# Patient Record
Sex: FEMALE
[De-identification: names, ages, dates, MRNs, and addresses within clinical notes are randomized per-mention and may not be internally consistent; named-entity substitution may affect disease eponyms.]

---

## 2024-04-30 PROBLEM — Z00.00 ENCOUNTER FOR PREVENTIVE HEALTH EXAMINATION: Status: ACTIVE | Noted: 2024-04-30

## 2024-05-02 ENCOUNTER — LABORATORY RESULT (OUTPATIENT)
Age: 44
End: 2024-05-02

## 2024-05-02 ENCOUNTER — APPOINTMENT (OUTPATIENT)
Dept: PULMONOLOGY | Facility: CLINIC | Age: 44
End: 2024-05-02
Payer: MEDICAID

## 2024-05-02 VITALS
OXYGEN SATURATION: 97 % | WEIGHT: 138 LBS | BODY MASS INDEX: 22.18 KG/M2 | HEART RATE: 95 BPM | HEIGHT: 66 IN | DIASTOLIC BLOOD PRESSURE: 86 MMHG | SYSTOLIC BLOOD PRESSURE: 121 MMHG

## 2024-05-02 DIAGNOSIS — J45.901 UNSPECIFIED ASTHMA WITH (ACUTE) EXACERBATION: ICD-10-CM

## 2024-05-02 PROCEDURE — 99204 OFFICE O/P NEW MOD 45 MIN: CPT | Mod: 25

## 2024-05-02 PROCEDURE — 94726 PLETHYSMOGRAPHY LUNG VOLUMES: CPT

## 2024-05-02 PROCEDURE — ZZZZZ: CPT

## 2024-05-02 PROCEDURE — 94729 DIFFUSING CAPACITY: CPT

## 2024-05-02 PROCEDURE — 94060 EVALUATION OF WHEEZING: CPT

## 2024-05-02 RX ORDER — ALBUTEROL SULFATE 90 UG/1
108 (90 BASE) INHALANT RESPIRATORY (INHALATION)
Qty: 1 | Refills: 2 | Status: ACTIVE | COMMUNITY
Start: 2024-05-02 | End: 1900-01-01

## 2024-05-02 NOTE — REASON FOR VISIT
[Initial] : an initial visit [Shortness of Breath] : shortness of breath [Wheezing] : wheezing [Parent] : parent

## 2024-05-03 LAB
BASOPHILS # BLD AUTO: 0.02 K/UL
BASOPHILS NFR BLD AUTO: 0.3 %
EOSINOPHIL # BLD AUTO: 0.08 K/UL
EOSINOPHIL NFR BLD AUTO: 1.2 %
HCT VFR BLD CALC: 41.8 %
HGB BLD-MCNC: 14 G/DL
IMM GRANULOCYTES NFR BLD AUTO: 0.8 %
LYMPHOCYTES # BLD AUTO: 2 K/UL
LYMPHOCYTES NFR BLD AUTO: 30.3 %
MAN DIFF?: NORMAL
MCHC RBC-ENTMCNC: 29.4 PG
MCHC RBC-ENTMCNC: 33.5 GM/DL
MCV RBC AUTO: 87.8 FL
MONOCYTES # BLD AUTO: 0.47 K/UL
MONOCYTES NFR BLD AUTO: 7.1 %
NEUTROPHILS # BLD AUTO: 3.98 K/UL
NEUTROPHILS NFR BLD AUTO: 60.3 %
PLATELET # BLD AUTO: 206 K/UL
RBC # BLD: 4.76 M/UL
RBC # FLD: 12.9 %
WBC # FLD AUTO: 6.6 K/UL

## 2024-05-03 NOTE — HISTORY OF PRESENT ILLNESS
[Current] : current [>= 20 pack years] : >= 20 pack years [TextBox_4] : 43-year-old female here for evaluation of shortness of breath and dyspnea on exertion.  Coexisting medical conditions include lupus for which she is on hydroxychloroquine and chronic fatigue for which she etiology is unclear.  She states that about 20 years ago, she became extremely fatigued after some kind of viral syndrome.  She had extensive workup which was unrevealing.  Her fatigue worsened over the years and she eventually had to stop working.  In November 2023, she had COVID and since then she has been experiencing shortness of breath and wheezing.  She was prescribed albuterol which seems to help.  PFT (05/02/2024): preBD: FEV1 2.35 (74%), FVC 3.25 (83%), FEV1/FVC 72%, postBD: FEV1 2.72 (86%), FVC 3.38 (86%), FEV1/FVC 80%, TLC 4.88 (86%), DLCO 17.2 (71%)   [TextBox_11] : 1 [TextBox_13] : 30

## 2024-05-03 NOTE — PHYSICAL EXAM
[No Acute Distress] : no acute distress [Normal Oropharynx] : normal oropharynx [Normal Appearance] : normal appearance [No Neck Mass] : no neck mass [Normal Rate/Rhythm] : normal rate/rhythm [Normal S1, S2] : normal s1, s2 [No Murmurs] : no murmurs [No Resp Distress] : no resp distress [Clear to Auscultation Bilaterally] : clear to auscultation bilaterally [Normal Gait] : normal gait [No Clubbing] : no clubbing [No Cyanosis] : no cyanosis [No Edema] : no edema [FROM] : FROM [Normal Color/ Pigmentation] : normal color/ pigmentation [Oriented x3] : oriented x3 [Normal Affect] : normal affect

## 2024-05-03 NOTE — ASSESSMENT
[FreeTextEntry1] : 43-year-old female with lupus and chronic fatigue here for evaluation of shortness of breath and dyspnea on exertion. No significant desaturation noted on ambulation. PFT unremarkable though there was bronchodilator response in setting of normal spirometry. Check FeNO May have component of reactive airway disease CBC with differential, allergen panel Albuterol as needed

## 2024-05-10 ENCOUNTER — NON-APPOINTMENT (OUTPATIENT)
Age: 44
End: 2024-05-10

## 2024-05-10 LAB
A ALTERNATA IGE QN: NORMAL
A FUMIGATUS IGE QN: <0.1 KUA/L
BERMUDA GRASS IGE QN: NORMAL
BOXELDER IGE QN: NORMAL
C HERBARUM IGE QN: <0.1 KUA/L
CALIF WALNUT IGE QN: NORMAL
CAT DANDER IGE QN: NORMAL
CMN PIGWEED IGE QN: <0.1 KUA/L
COMMON RAGWEED IGE QN: <0.1 KUA/L
COTTONWOOD IGE QN: NORMAL
D FARINAE IGE QN: NORMAL
D PTERONYSS IGE QN: 5.49 KUA/L
DEPRECATED A ALTERNATA IGE RAST QL: NORMAL (ref 0–?)
DEPRECATED A FUMIGATUS IGE RAST QL: 0 (ref 0–?)
DEPRECATED BERMUDA GRASS IGE RAST QL: NORMAL (ref 0–?)
DEPRECATED BOXELDER IGE RAST QL: NORMAL (ref 0–?)
DEPRECATED C HERBARUM IGE RAST QL: 0 (ref 0–?)
DEPRECATED CAT DANDER IGE RAST QL: NORMAL (ref 0–?)
DEPRECATED COMMON PIGWEED IGE RAST QL: 0 (ref 0–?)
DEPRECATED COMMON RAGWEED IGE RAST QL: 0 (ref 0–?)
DEPRECATED COTTONWOOD IGE RAST QL: NORMAL (ref 0–?)
DEPRECATED D FARINAE IGE RAST QL: NORMAL (ref 0–?)
DEPRECATED D PTERONYSS IGE RAST QL: 3 (ref 0–?)
DEPRECATED DOG DANDER IGE RAST QL: 0 (ref 0–?)
DEPRECATED GOOSEFOOT IGE RAST QL: 0 (ref 0–?)
DEPRECATED LONDON PLANE IGE RAST QL: 0 (ref 0–?)
DEPRECATED MOUSE URINE PROT IGE RAST QL: NORMAL (ref 0–?)
DEPRECATED MUGWORT IGE RAST QL: NORMAL (ref 0–?)
DEPRECATED P NOTATUM IGE RAST QL: 0 (ref 0–?)
DEPRECATED RED CEDAR IGE RAST QL: NORMAL (ref 0–?)
DEPRECATED ROACH IGE RAST QL: 0 (ref 0–?)
DEPRECATED SHEEP SORREL IGE RAST QL: 0 (ref 0–?)
DEPRECATED SILVER BIRCH IGE RAST QL: NORMAL (ref 0–?)
DEPRECATED TIMOTHY IGE RAST QL: 0 (ref 0–?)
DEPRECATED WHITE ASH IGE RAST QL: 0 (ref 0–?)
DEPRECATED WHITE OAK IGE RAST QL: NORMAL (ref 0–?)
DOG DANDER IGE QN: <0.1 KUA/L
GOOSEFOOT IGE QN: <0.1 KUA/L
LONDON PLANE IGE QN: <0.1 KUA/L
MOUSE URINE PROT IGE QN: NORMAL
MUGWORT IGE QN: NORMAL
MULBERRY (T70) CLASS: NORMAL (ref 0–?)
MULBERRY (T70) CONC: NORMAL
P NOTATUM IGE QN: <0.1 KUA/L
RED CEDAR IGE QN: NORMAL
ROACH IGE QN: <0.1 KUA/L
SHEEP SORREL IGE QN: <0.1 KUA/L
SILVER BIRCH IGE QN: NORMAL
TIMOTHY IGE QN: <0.1 KUA/L
TOTAL IGE SMQN RAST: 80 KU/L
TREE ALLERG MIX1 IGE QL: NORMAL (ref 0–?)
WHITE ASH IGE QN: <0.1 KUA/L
WHITE ELM IGE QN: 0 (ref 0–?)
WHITE ELM IGE QN: <0.1 KUA/L
WHITE OAK IGE QN: NORMAL

## 2024-09-11 ENCOUNTER — APPOINTMENT (OUTPATIENT)
Dept: PULMONOLOGY | Facility: CLINIC | Age: 44
End: 2024-09-11
Payer: MEDICAID

## 2024-09-11 VITALS
DIASTOLIC BLOOD PRESSURE: 85 MMHG | SYSTOLIC BLOOD PRESSURE: 131 MMHG | BODY MASS INDEX: 20.73 KG/M2 | OXYGEN SATURATION: 96 % | HEIGHT: 66 IN | WEIGHT: 129 LBS | HEART RATE: 83 BPM

## 2024-09-11 PROCEDURE — 99214 OFFICE O/P EST MOD 30 MIN: CPT | Mod: 25

## 2024-09-11 PROCEDURE — 94729 DIFFUSING CAPACITY: CPT

## 2024-09-11 PROCEDURE — ZZZZZ: CPT

## 2024-09-11 PROCEDURE — 94010 BREATHING CAPACITY TEST: CPT

## 2024-09-11 PROCEDURE — 95012 NITRIC OXIDE EXP GAS DETER: CPT

## 2024-09-11 PROCEDURE — 94727 GAS DIL/WSHOT DETER LNG VOL: CPT

## 2024-09-11 NOTE — HISTORY OF PRESENT ILLNESS
[Current] : current [>= 20 pack years] : >= 20 pack years [TextBox_4] : 44-year-old female here for evaluation of shortness of breath and dyspnea on exertion.  Coexisting medical conditions include lupus for which she is on hydroxychloroquine and chronic fatigue for which she etiology is unclear.  She is doing better from a pulmonary perspective. albuterol is helping her shortness of breathing. Not really experiencing that any longer unless she really exerts herself. Dust does seem to trigger her. She is using albuterol about 3 times per day.    PFT (05/02/2024): preBD: FEV1 2.35 (74%), FVC 3.25 (83%), FEV1/FVC 72%, postBD: FEV1 2.72 (86%), FVC 3.38 (86%), FEV1/FVC 80%, TLC 4.88 (86%), DLCO 17.2 (71%) PFT (09/11/2024 )FEV1 2.46 (79%), FVC 3.13 (80%), FEV1/FVC 79%, TLC 4.63 (82%), DLCO 18.1 (75%)     [TextBox_11] : 1 [TextBox_13] : 30

## 2024-09-11 NOTE — REASON FOR VISIT
[Follow-Up] : a follow-up visit [Shortness of Breath] : shortness of breath [Wheezing] : wheezing [Parent] : parent

## 2024-09-11 NOTE — ASSESSMENT
[FreeTextEntry1] : 44-year-old female with lupus and chronic fatigue here for evaluation of shortness of breath and dyspnea on exertion. PFT normal and FeNO also within normal limits. No significant eosinophilia. Mild reactivity to dust.  Experiencing clinical benefit with albuterol which she can continue.  Follow up in 6 - 8 months, sooner if needed.

## 2024-11-18 ENCOUNTER — NON-APPOINTMENT (OUTPATIENT)
Age: 44
End: 2024-11-18

## 2025-02-07 ENCOUNTER — RX RENEWAL (OUTPATIENT)
Age: 45
End: 2025-02-07